# Patient Record
(demographics unavailable — no encounter records)

---

## 2025-01-09 NOTE — PROCEDURE
[de-identified] : indication: pain  Ultrasound Guided Right Knee Orthovisc Injection  After informed consent, she was placed in a seated position. Injection site was localized using anatomical landmarks. The skin was sterilely prepped. Using routine sterile technique and an anterolateral approach, Orthovisc was instilled using ultrasound guidance. There were no complications and a bandage was applied. She  tolerated the procedure well and was given post-injection instructions. Rec: Cold therapy, analgesics, avoid heavy activity.  Manufacture [employer] Lot [id number] Exp 10/3/26 NDC [id number]

## 2025-01-09 NOTE — DATA REVIEWED
[FreeTextEntry1] :  office xrays of left shoulder ap and outlet show mild AC joint oa and mild/moderate GH oa.

## 2025-01-09 NOTE — ASSESSMENT
[FreeTextEntry1] : MRI right shoulder - full thickness tear of biceps; moderate subscapularis tear advanced GH OA  Discussed diagnosis and treatment plan including PT. discussed injection  knee - f/u next wk

## 2025-01-09 NOTE — PHYSICAL EXAM
[FreeTextEntry1] : She is a 89 year old female in NAD   SHOULDER: no erythema, warmth, swelling, abd to 150 deg on right, 170 on left,  ER to 80, IR to 30 +empty can worse on right  right KNEE: large effusion, no erythema, warmth

## 2025-01-09 NOTE — HISTORY OF PRESENT ILLNESS
[FreeTextEntry1] : Right shoulder hurting more than left.  Saw Dr Gaitan but did not recommend surgery due to age.  Pain with lifting arm and sleeping.   Right knee hurting and swells up again.  Pain with walking.  Injections help.

## 2025-01-16 NOTE — ASSESSMENT
[FreeTextEntry1] : Discussed at length with patient underlying prior imaging with rotator cuff arthropathy and treatment options she expresses understanding that ultimately definitive cure would be reverse shoulder arthroplasty but the time she elects PT as well as cortisone injection of the right shoulder she will follow-up as needed

## 2025-01-16 NOTE — PROCEDURE
[de-identified] : Patient has demonstrated limited relief from NSAIDS, rest, exercises / PT, and after discussion of the risks and benefits, the patient has elected to proceed with a corticosteroid injection into the RIGHT shoulder via Posterolateral site. Confirmed that the patient does not have history of prior adverse reactions, active, infections, or relevant allergies.   There was no erythema or warmth, and the skin was clear.  The skin was sterilized with alcohol and via sterile technique, the shoulder was injected with 3 cc of 1% xylocaine and 40 mg of Kenalog.  The injection was completed without complication and a bandage was applied.  The patient tolerated the procedure well and was given post-injection instructions.

## 2025-01-16 NOTE — HISTORY OF PRESENT ILLNESS
[de-identified] :  Reason: right shoulder pain got worse  no falls or injuries  xray done with   Symptoms: throbbing / stabbing Pain Level: 10/10 Physical therapy/ Home exercises: no

## 2025-01-16 NOTE — ASSESSMENT
[FreeTextEntry1] : Seeing vascular for legs.  See Neurologist for balance issues.  Gave info for Dr Diaz.    f/u 1 wk

## 2025-01-16 NOTE — PHYSICAL EXAM
[de-identified] : Right Shoulder: Constitutional: The patient is healthy-appearing and in no apparent distress.   Cardiovascular System:  The capillary refill is less than 2 seconds.   Skin:  There are no skin abnormalities.  C-Spine/Neck:  Active Range of Motion: Flexion				50 Extension			60 Lateral rotation			80    Right Shoulder:  Inspection:  There is no atrophy, erythema, warmth, swelling. There is no scapular winging. There is no AC prominence.   Bony Palpation:  There is no tenderness of the clavicle. There is no tenderness of the acromioclavicular joint. There is no tenderness of the greater tuberosity.  There is no tenderness of the bicipital groove.   Soft Tissue Palpation:  There is no tenderness of the trapezius. There is no tenderness of the rhomboid. There is no tenderness of the subacromial bursa.   Active Range of Motion:  Forward flexion- 				160  Abduction-					130 External rotation at 0 degrees abduction-	50  Internal rotation at 0 degrees abduction-	80  Passive Range of Motion:  Forward flexion- 			160  Abduction-				130 External rotation at 0 deg abduction-	80  Internal rotation at 0 deg abduction-	80  Special Tests:  Hawkin's  				Positive  Neer's  				Positive  Speed's  				Negative AC cross-over 			            Negative Kenosha's  				Negative  Stability:  There is no general laxity.  There is no anterior apprehension.  Strength:  Supraspinatus abduction 		4/5 External rotation at 0 deg abduction 	4/5 Internal rotation at 0 deg abduction	5/5 Scapular elevation 			5/5   Neurological System:   There is normal sensation to light touch C5-T1.   Stability:  There is no general laxity.   Psychiatric:  The patient demonstrates a normal mood and affect and is active and alert.

## 2025-01-16 NOTE — PROCEDURE
[de-identified] : indication: pain  Ultrasound Guided Right Knee Aspiration and Orthovisc Injection  After informed consent, she was placed in a seated position. Injection site was localized using anatomical landmarks. The skin was sterilely prepped. Using routine sterile technique and a superolateral approach, 9 ml of clear yellow fluid was aspirated.  Then Orthovisc was instilled using ultrasound guidance. There were no complications and a bandage was applied. She  tolerated the procedure well and was given post-injection instructions. Rec: Cold therapy, analgesics, avoid heavy activity.  Manufacture [employer] Lot [id number] Exp 10/3/26 NDC [id number]

## 2025-01-23 NOTE — PROCEDURE
[de-identified] : indication: pain  Ultrasound Guided Right Knee Orthovisc Injection 3 of 3  After informed consent, she was placed in a seated position. Injection site was localized using anatomical landmarks. The skin was sterilely prepped. Using routine sterile technique and anterolateral approach, Orthovisc was instilled using ultrasound guidance. There were no complications and a bandage was applied. She  tolerated the procedure well and was given post-injection instructions. Rec: Cold therapy, analgesics, avoid heavy activity.  Manufacture [employer] Lot [id number] Exp 10/3/26 NDC [id number]

## 2025-01-23 NOTE — ASSESSMENT
[FreeTextEntry1] : Knee buckling sometimes.  No pain.  She has not been doing strengthening exercises so taught KE.  Get ankle weights.  Start climbing stairs carefully.  f/u 2 wk. Let me know if buckling gets worse.

## 2025-04-30 NOTE — HISTORY OF PRESENT ILLNESS
[FreeTextEntry1] : Right shoulder hurting more than left. 9/10.   Saw Dr Gaitan but did not recommend surgery due to age.  Pain with lifting arm and sleeping.   Right knee hurting and swells up again.  Pain with walking.  Injections help.  1/2025 right knee orthovisc

## 2025-04-30 NOTE — ASSESSMENT
[FreeTextEntry1] : MRI right shoulder - full thickness tear of biceps; moderate subscapularis tear advanced GH OA  Discussed diagnosis and treatment plan including PT. discussed injection avoid sleeping on shoulder can try to hit easy balls in tennis in a few days since ROM improved She understands risk of immunosuppression from steroids.    knee - taught wall squats and hip abd  f/u 2 wk

## 2025-04-30 NOTE — PROCEDURE
[de-identified] : indication: pain   Ultrasound Guided Right Shoulder Injection   After discussion of the risks and benefits, she  elected to proceed with a corticosteroid injection into the subacromial space.   Confirmed that the patient does not have history of prior adverse reactions, active infections, or relevant allergies. There was no effusion, erythema, or warmth, and the skin was clear.   The skin was sterilized and then anesthetized with 1% Lidocaine. Under routine sterile technique, the site was injected with a mixture of 3 ml of 0.5% Lidocaine and 20 mg of Kenalog using ultrasound guidance. The injection was completed without complication and a bandage was applied.     She tolerated the procedure well and was given post-injection instructions. Rec: Cold therapy, analgesics, avoid heavy activity.       Lidocaine Manufacture [employer] NDC 8518-7592-89 Exp 9/25 LOT [id number]       Triamcinolone NDC [address]-5 Exp 11/25 Lot [id number] Manufacture [employer]

## 2025-04-30 NOTE — PHYSICAL EXAM
[FreeTextEntry1] : She is a 89 year old female in NAD   SHOULDER: no erythema, warmth, swelling, abd to 160 deg on right, 160 on left,  ER to 90, IR to 40 +empty can worse on right  right KNEE: large effusion, no erythema, warmth ext normal

## 2025-06-02 NOTE — ASSESSMENT
[FreeTextEntry1] : MRI right shoulder - full thickness tear of biceps; moderate subscapularis tear advanced GH OA  Discussed diagnosis and treatment plan including PT. avoid sleeping on shoulder  knee - do wall squats in a couple days She understands risk of immunosuppression from steroids.    chronic ankle sprain - Discussed diagnosis and treatment plan including PT. saw vascular already so f/u with them so possible stockings keep elevating leg  f/u 2 wk

## 2025-06-02 NOTE — PHYSICAL EXAM
[FreeTextEntry1] : She is a 89 year old female in NAD   right SHOULDER: no erythema, warmth, swelling, abd to 160 deg on right, ER to 90, IR to 40 +empty can   right KNEE: large effusion, no erythema, warmth ext normal  right ankle: mild swelling, foot erythema but no warmth TTP in ATFL and CFL neg inversion test and ankle drawer test

## 2025-06-02 NOTE — HISTORY OF PRESENT ILLNESS
[FreeTextEntry1] : Right shoulder hurting more than left. 9/10.   Saw Dr Gaitan but did not recommend surgery due to age.  Pain with lifting arm and sleeping.  4/2025 right subacromial injection  Right knee hurting and swelled up again.  Pain with walking.  Injections help.  1/2025 right knee orthovisc  Right lateral ankle hurting for years.  Pain with movement.  It swells up and turns purple sometimes.  Elevation helps.

## 2025-06-02 NOTE — PROCEDURE
[de-identified] : indication: pain  Ultrasound Guided Right Knee Aspiration and Steroid Injection  After informed consent, she was placed in a seated position.  Injection site was localized using anatomical landmarks.  The skin was sterilely prepped.  Using routine sterile technique and a superolateral approach, 9 ml of clear yellow fluid was aspirated.  Then a steroid solution consisting of 20 mg of Kenalog and 2 ml of 1% Lidocaine was instilled using ultrasound guidance.  There were no complications and a bandage was applied.  She tolerated the procedure well and was given post-injection instructions.  Rec: Cold therapy, analgesics, avoid heavy activity.      Lidocaine Manufacture [employer] NDC 8193-6626-89 Exp 9/25 LOT [id number]       Triamcinolone NDC [address]-5 Exp 11/25 Lot [id number] Manufacture [employer]